# Patient Record
Sex: FEMALE | Race: WHITE | NOT HISPANIC OR LATINO
[De-identification: names, ages, dates, MRNs, and addresses within clinical notes are randomized per-mention and may not be internally consistent; named-entity substitution may affect disease eponyms.]

---

## 2019-04-09 PROBLEM — Z00.00 ENCOUNTER FOR PREVENTIVE HEALTH EXAMINATION: Status: ACTIVE | Noted: 2019-04-09

## 2019-04-12 ENCOUNTER — APPOINTMENT (OUTPATIENT)
Dept: OBGYN | Facility: CLINIC | Age: 44
End: 2019-04-12
Payer: COMMERCIAL

## 2019-04-12 ENCOUNTER — RX RENEWAL (OUTPATIENT)
Age: 44
End: 2019-04-12

## 2019-04-12 ENCOUNTER — ASOB RESULT (OUTPATIENT)
Age: 44
End: 2019-04-12

## 2019-04-12 PROCEDURE — 76811 OB US DETAILED SNGL FETUS: CPT

## 2019-04-12 PROCEDURE — 99213 OFFICE O/P EST LOW 20 MIN: CPT

## 2019-04-12 RX ORDER — PRENATAL VIT NO.126/IRON/FOLIC 28MG-0.8MG
28-0.8 TABLET ORAL DAILY
Qty: 90 | Refills: 3 | Status: ACTIVE | COMMUNITY
Start: 2019-04-12 | End: 1900-01-01

## 2019-04-12 NOTE — DISCUSSION/SUMMARY
[FreeTextEntry1] : a43 old white female came to the office for urgent exam patient hasn't had a period since November her throat she was going through the changes her sats with her PCP told her into the office pregnancy test is positive patient is 20 weeks pregnant ultrasound was done to confirm the pregnancy patient returned to a Monday for blood work to be done reassure the patient that she is okay and we'll reevaluate in 2 weeks I spent 25 minutes of the

## 2019-04-15 ENCOUNTER — APPOINTMENT (OUTPATIENT)
Dept: OBGYN | Facility: CLINIC | Age: 44
End: 2019-04-15
Payer: COMMERCIAL

## 2019-04-15 ENCOUNTER — LABORATORY RESULT (OUTPATIENT)
Age: 44
End: 2019-04-15

## 2019-04-15 PROCEDURE — 36415 COLL VENOUS BLD VENIPUNCTURE: CPT

## 2019-04-16 LAB
ABO + RH PNL BLD: NORMAL
APPEARANCE: CLEAR
BACTERIA: NEGATIVE
BASOPHILS # BLD AUTO: 0.08 K/UL
BASOPHILS NFR BLD AUTO: 0.5 %
BILIRUBIN URINE: NEGATIVE
BLD GP AB SCN SERPL QL: NORMAL
BLOOD URINE: NEGATIVE
COLOR: NORMAL
EOSINOPHIL # BLD AUTO: 0.36 K/UL
EOSINOPHIL NFR BLD AUTO: 2.3 %
GLUCOSE 1H P 50 G GLC PO SERPL-MCNC: 123 MG/DL
GLUCOSE QUALITATIVE U: NEGATIVE
HCT VFR BLD CALC: 42 %
HGB BLD-MCNC: 13.3 G/DL
HIV1+2 AB SPEC QL IA.RAPID: NONREACTIVE
HYALINE CASTS: 0 /LPF
IMM GRANULOCYTES NFR BLD AUTO: 1.1 %
KETONES URINE: ABNORMAL
LEUKOCYTE ESTERASE URINE: NEGATIVE
LYMPHOCYTES # BLD AUTO: 3.33 K/UL
LYMPHOCYTES NFR BLD AUTO: 21 %
MAN DIFF?: NORMAL
MCHC RBC-ENTMCNC: 30.7 PG
MCHC RBC-ENTMCNC: 31.7 GM/DL
MCV RBC AUTO: 97 FL
MICROSCOPIC-UA: NORMAL
MONOCYTES # BLD AUTO: 0.56 K/UL
MONOCYTES NFR BLD AUTO: 3.5 %
NEUTROPHILS # BLD AUTO: 11.33 K/UL
NEUTROPHILS NFR BLD AUTO: 71.6 %
NITRITE URINE: NEGATIVE
PH URINE: 6.5
PLATELET # BLD AUTO: 334 K/UL
PROTEIN URINE: NEGATIVE
RBC # BLD: 4.33 M/UL
RBC # FLD: 13.7 %
RED BLOOD CELLS URINE: 1 /HPF
SPECIFIC GRAVITY URINE: 1.01
SQUAMOUS EPITHELIAL CELLS: 6 /HPF
TSH SERPL-ACNC: 2.72 UIU/ML
UROBILINOGEN URINE: NORMAL
WBC # FLD AUTO: 15.83 K/UL
WHITE BLOOD CELLS URINE: 0 /HPF

## 2019-04-17 LAB
2ND TRIMESTER DATA: NORMAL
AFP PNL SERPL: NORMAL
AFP SERPL-ACNC: NORMAL
B-HCG FREE SERPL-MCNC: NORMAL
B19V IGG SER QL IA: 6.9 INDEX
B19V IGG+IGM SER-IMP: NORMAL
B19V IGG+IGM SER-IMP: POSITIVE
B19V IGM FLD-ACNC: 0.2 INDEX
B19V IGM SER-ACNC: NEGATIVE
BACTERIA UR CULT: NORMAL
CLINICAL BIOCHEMIST REVIEW: NORMAL
CMV IGG SERPL QL: <0.2 U/ML
CMV IGG SERPL-IMP: NEGATIVE
CMV IGM SERPL QL: <8 AU/ML
CMV IGM SERPL QL: NEGATIVE
HBV SURFACE AB SER QL: REACTIVE
HBV SURFACE AG SER QL: NONREACTIVE
HGB A MFR BLD: 97.1 %
HGB A2 MFR BLD: 2.9 %
HGB FRACT BLD-IMP: NORMAL
INHIBIN A SERPL-MCNC: NORMAL
NOTES NTD: NORMAL
RUBV IGG FLD-ACNC: 2 INDEX
RUBV IGG SER-IMP: POSITIVE
T GONDII AB SER-IMP: NEGATIVE
T GONDII AB SER-IMP: NEGATIVE
T GONDII IGG SER QL: <3 IU/ML
T GONDII IGM SER QL: 3.8 AU/ML
U ESTRIOL SERPL-SCNC: NORMAL

## 2019-04-18 LAB — RPR SER-TITR: NORMAL

## 2019-05-10 ENCOUNTER — NON-APPOINTMENT (OUTPATIENT)
Age: 44
End: 2019-05-10

## 2019-05-10 ENCOUNTER — APPOINTMENT (OUTPATIENT)
Dept: OBGYN | Facility: CLINIC | Age: 44
End: 2019-05-10
Payer: COMMERCIAL

## 2019-05-10 VITALS
WEIGHT: 231 LBS | DIASTOLIC BLOOD PRESSURE: 80 MMHG | SYSTOLIC BLOOD PRESSURE: 120 MMHG | BODY MASS INDEX: 34.21 KG/M2 | HEIGHT: 69 IN

## 2019-05-10 PROCEDURE — 0502F SUBSEQUENT PRENATAL CARE: CPT

## 2019-05-10 PROCEDURE — 59425 ANTEPARTUM CARE ONLY: CPT

## 2019-05-13 LAB
MEV IGG FLD QL IA: 238 AU/ML
MEV IGG+IGM SER-IMP: POSITIVE

## 2019-06-07 ENCOUNTER — APPOINTMENT (OUTPATIENT)
Dept: OBGYN | Facility: CLINIC | Age: 44
End: 2019-06-07
Payer: COMMERCIAL

## 2019-06-07 ENCOUNTER — ASOB RESULT (OUTPATIENT)
Age: 44
End: 2019-06-07

## 2019-06-07 ENCOUNTER — NON-APPOINTMENT (OUTPATIENT)
Age: 44
End: 2019-06-07

## 2019-06-07 VITALS — HEIGHT: 69 IN | SYSTOLIC BLOOD PRESSURE: 110 MMHG | DIASTOLIC BLOOD PRESSURE: 62 MMHG

## 2019-06-07 DIAGNOSIS — Z3A.33 33 WEEKS GESTATION OF PREGNANCY: ICD-10-CM

## 2019-06-07 LAB
BILIRUB UR QL STRIP: NORMAL
GLUCOSE UR-MCNC: NORMAL
HCG UR QL: 0.2 EU/DL
HGB UR QL STRIP.AUTO: NORMAL
KETONES UR-MCNC: NORMAL
LEUKOCYTE ESTERASE UR QL STRIP: NORMAL
NITRITE UR QL STRIP: NORMAL
PH UR STRIP: 6.5
PROT UR STRIP-MCNC: NORMAL
SP GR UR STRIP: 1.01

## 2019-06-07 PROCEDURE — 76816 OB US FOLLOW-UP PER FETUS: CPT

## 2019-06-07 PROCEDURE — 0502F SUBSEQUENT PRENATAL CARE: CPT

## 2019-06-21 ENCOUNTER — NON-APPOINTMENT (OUTPATIENT)
Age: 44
End: 2019-06-21

## 2019-06-21 ENCOUNTER — APPOINTMENT (OUTPATIENT)
Dept: OBGYN | Facility: CLINIC | Age: 44
End: 2019-06-21
Payer: COMMERCIAL

## 2019-06-21 VITALS
SYSTOLIC BLOOD PRESSURE: 110 MMHG | DIASTOLIC BLOOD PRESSURE: 70 MMHG | BODY MASS INDEX: 35.25 KG/M2 | HEIGHT: 69 IN | WEIGHT: 238 LBS

## 2019-06-21 PROCEDURE — 0502F SUBSEQUENT PRENATAL CARE: CPT | Mod: 25

## 2019-06-21 PROCEDURE — 36415 COLL VENOUS BLD VENIPUNCTURE: CPT

## 2019-06-21 PROCEDURE — 81003 URINALYSIS AUTO W/O SCOPE: CPT | Mod: QW

## 2019-06-22 LAB
APPEARANCE: CLEAR
BACTERIA: NEGATIVE
BASOPHILS # BLD AUTO: 0.05 K/UL
BASOPHILS NFR BLD AUTO: 0.4 %
BILIRUB UR QL STRIP: NORMAL
BILIRUBIN URINE: NEGATIVE
BLOOD URINE: NEGATIVE
COLOR: YELLOW
EOSINOPHIL # BLD AUTO: 0.11 K/UL
EOSINOPHIL NFR BLD AUTO: 0.8 %
GLUCOSE BS SERPL-MCNC: 81 MG/DL
GLUCOSE QUALITATIVE U: NEGATIVE
GLUCOSE UR-MCNC: NORMAL
HBV SURFACE AG SER QL: NONREACTIVE
HCG UR QL: 0.2 EU/DL
HCT VFR BLD CALC: 40.1 %
HGB BLD-MCNC: 12.7 G/DL
HGB UR QL STRIP.AUTO: NORMAL
HIV1+2 AB SPEC QL IA.RAPID: NONREACTIVE
HYALINE CASTS: 0 /LPF
IMM GRANULOCYTES NFR BLD AUTO: 1 %
KETONES UR-MCNC: 15
KETONES URINE: ABNORMAL
LEUKOCYTE ESTERASE UR QL STRIP: NORMAL
LEUKOCYTE ESTERASE URINE: NEGATIVE
LYMPHOCYTES # BLD AUTO: 2.48 K/UL
LYMPHOCYTES NFR BLD AUTO: 18.9 %
MAN DIFF?: NORMAL
MCHC RBC-ENTMCNC: 31.4 PG
MCHC RBC-ENTMCNC: 31.7 GM/DL
MCV RBC AUTO: 99 FL
MICROSCOPIC-UA: NORMAL
MONOCYTES # BLD AUTO: 0.67 K/UL
MONOCYTES NFR BLD AUTO: 5.1 %
NEUTROPHILS # BLD AUTO: 9.69 K/UL
NEUTROPHILS NFR BLD AUTO: 73.8 %
NITRITE UR QL STRIP: NORMAL
NITRITE URINE: NEGATIVE
PH UR STRIP: 7
PH URINE: 7
PLATELET # BLD AUTO: 257 K/UL
PROT UR STRIP-MCNC: NORMAL
PROTEIN URINE: NEGATIVE
RBC # BLD: 4.05 M/UL
RBC # FLD: 13.9 %
RED BLOOD CELLS URINE: 1 /HPF
SP GR UR STRIP: 1.01
SPECIFIC GRAVITY URINE: 1.01
SQUAMOUS EPITHELIAL CELLS: 2 /HPF
UROBILINOGEN URINE: NORMAL
WBC # FLD AUTO: 13.13 K/UL
WHITE BLOOD CELLS URINE: 0 /HPF

## 2019-06-25 LAB
ABO + RH PNL BLD: NORMAL
BLD GP AB SCN SERPL QL: NORMAL
ESTIMATED AVERAGE GLUCOSE: 117 MG/DL
HBA1C MFR BLD HPLC: 5.7 %
HBV SURFACE AB SER QL: REACTIVE
RPR SER-TITR: NORMAL

## 2019-07-12 ENCOUNTER — NON-APPOINTMENT (OUTPATIENT)
Age: 44
End: 2019-07-12

## 2019-07-12 ENCOUNTER — APPOINTMENT (OUTPATIENT)
Dept: OBGYN | Facility: CLINIC | Age: 44
End: 2019-07-12
Payer: COMMERCIAL

## 2019-07-12 VITALS
DIASTOLIC BLOOD PRESSURE: 74 MMHG | WEIGHT: 240 LBS | BODY MASS INDEX: 35.55 KG/M2 | SYSTOLIC BLOOD PRESSURE: 108 MMHG | HEIGHT: 69 IN

## 2019-07-12 LAB
BILIRUB UR QL STRIP: NORMAL
CLARITY UR: CLEAR
COLLECTION METHOD: NORMAL
GLUCOSE UR-MCNC: NORMAL
HCG UR QL: 0.2 EU/DL
HGB UR QL STRIP.AUTO: NORMAL
KETONES UR-MCNC: NORMAL
LEUKOCYTE ESTERASE UR QL STRIP: NORMAL
NITRITE UR QL STRIP: NORMAL
PH UR STRIP: 7
PROT UR STRIP-MCNC: NORMAL
SP GR UR STRIP: 1.01

## 2019-07-12 PROCEDURE — 0502F SUBSEQUENT PRENATAL CARE: CPT

## 2019-07-12 PROCEDURE — 90715 TDAP VACCINE 7 YRS/> IM: CPT

## 2019-07-12 PROCEDURE — 96372 THER/PROPH/DIAG INJ SC/IM: CPT

## 2019-07-12 PROCEDURE — 90471 IMMUNIZATION ADMIN: CPT

## 2019-08-01 ENCOUNTER — TRANSCRIPTION ENCOUNTER (OUTPATIENT)
Age: 44
End: 2019-08-01

## 2019-08-01 ENCOUNTER — INPATIENT (INPATIENT)
Facility: HOSPITAL | Age: 44
LOS: 0 days | Discharge: ANOTHER HOSPITAL | End: 2019-08-02
Payer: COMMERCIAL

## 2019-08-01 PROCEDURE — 99285 EMERGENCY DEPT VISIT HI MDM: CPT

## 2019-08-02 ENCOUNTER — APPOINTMENT (OUTPATIENT)
Dept: OBGYN | Facility: CLINIC | Age: 44
End: 2019-08-02

## 2019-08-02 ENCOUNTER — INPATIENT (INPATIENT)
Facility: HOSPITAL | Age: 44
LOS: 2 days | Discharge: HOME | End: 2019-08-05
Attending: OBSTETRICS & GYNECOLOGY | Admitting: OBSTETRICS & GYNECOLOGY
Payer: COMMERCIAL

## 2019-08-02 ENCOUNTER — RESULT REVIEW (OUTPATIENT)
Age: 44
End: 2019-08-02

## 2019-08-02 VITALS
RESPIRATION RATE: 18 BRPM | TEMPERATURE: 100 F | HEIGHT: 69 IN | DIASTOLIC BLOOD PRESSURE: 68 MMHG | HEART RATE: 107 BPM | WEIGHT: 240.08 LBS | SYSTOLIC BLOOD PRESSURE: 133 MMHG

## 2019-08-02 DIAGNOSIS — O47.1 FALSE LABOR AT OR AFTER 37 COMPLETED WEEKS OF GESTATION: ICD-10-CM

## 2019-08-02 LAB
BASOPHILS # BLD AUTO: 0.03 K/UL — SIGNIFICANT CHANGE UP (ref 0–0.2)
BASOPHILS NFR BLD AUTO: 0.2 % — SIGNIFICANT CHANGE UP (ref 0–2)
BLD GP AB SCN SERPL QL: SIGNIFICANT CHANGE UP
EOSINOPHIL # BLD AUTO: 0.02 K/UL — SIGNIFICANT CHANGE UP (ref 0–0.5)
EOSINOPHIL NFR BLD AUTO: 0.1 % — SIGNIFICANT CHANGE UP (ref 0–6)
HCT VFR BLD CALC: 36.8 % — SIGNIFICANT CHANGE UP (ref 34.5–45)
HGB BLD-MCNC: 12.3 G/DL — SIGNIFICANT CHANGE UP (ref 11.5–15.5)
HIV 1 & 2 AB SERPL IA.RAPID: SIGNIFICANT CHANGE UP
IMM GRANULOCYTES NFR BLD AUTO: 0.5 % — SIGNIFICANT CHANGE UP (ref 0–1.5)
LYMPHOCYTES # BLD AUTO: 1.16 K/UL — SIGNIFICANT CHANGE UP (ref 1–3.3)
LYMPHOCYTES # BLD AUTO: 7.3 % — LOW (ref 13–44)
MCHC RBC-ENTMCNC: 31.5 PG — SIGNIFICANT CHANGE UP (ref 27–34)
MCHC RBC-ENTMCNC: 33.4 GM/DL — SIGNIFICANT CHANGE UP (ref 32–36)
MCV RBC AUTO: 94.4 FL — SIGNIFICANT CHANGE UP (ref 80–100)
MONOCYTES # BLD AUTO: 0.23 K/UL — SIGNIFICANT CHANGE UP (ref 0–0.9)
MONOCYTES NFR BLD AUTO: 1.4 % — LOW (ref 2–14)
NEUTROPHILS # BLD AUTO: 14.45 K/UL — HIGH (ref 1.8–7.4)
NEUTROPHILS NFR BLD AUTO: 90.5 % — HIGH (ref 43–77)
PLATELET # BLD AUTO: 221 K/UL — SIGNIFICANT CHANGE UP (ref 150–400)
RBC # BLD: 3.9 M/UL — SIGNIFICANT CHANGE UP (ref 3.8–5.2)
RBC # FLD: 13.3 % — SIGNIFICANT CHANGE UP (ref 10.3–14.5)
WBC # BLD: 15.97 K/UL — HIGH (ref 3.8–10.5)
WBC # FLD AUTO: 15.97 K/UL — HIGH (ref 3.8–10.5)

## 2019-08-02 PROCEDURE — 88302 TISSUE EXAM BY PATHOLOGIST: CPT | Mod: 26

## 2019-08-02 PROCEDURE — 58611 LIGATE OVIDUCT(S) ADD-ON: CPT

## 2019-08-02 PROCEDURE — 59514 CESAREAN DELIVERY ONLY: CPT

## 2019-08-02 RX ORDER — AZITHROMYCIN 500 MG/1
500 TABLET, FILM COATED ORAL ONCE
Refills: 0 | Status: COMPLETED | OUTPATIENT
Start: 2019-08-02 | End: 2019-08-02

## 2019-08-02 RX ORDER — SODIUM CHLORIDE 9 MG/ML
1000 INJECTION, SOLUTION INTRAVENOUS
Refills: 0 | Status: DISCONTINUED | OUTPATIENT
Start: 2019-08-02 | End: 2019-08-05

## 2019-08-02 RX ORDER — NALOXONE HYDROCHLORIDE 4 MG/.1ML
0.1 SPRAY NASAL
Refills: 0 | Status: DISCONTINUED | OUTPATIENT
Start: 2019-08-02 | End: 2019-08-05

## 2019-08-02 RX ORDER — KETOROLAC TROMETHAMINE 30 MG/ML
30 SYRINGE (ML) INJECTION EVERY 6 HOURS
Refills: 0 | Status: DISCONTINUED | OUTPATIENT
Start: 2019-08-02 | End: 2019-08-03

## 2019-08-02 RX ORDER — FAMOTIDINE 10 MG/ML
20 INJECTION INTRAVENOUS ONCE
Refills: 0 | Status: COMPLETED | OUTPATIENT
Start: 2019-08-02 | End: 2019-08-02

## 2019-08-02 RX ORDER — CITRIC ACID/SODIUM CITRATE 300-500 MG
30 SOLUTION, ORAL ORAL ONCE
Refills: 0 | Status: COMPLETED | OUTPATIENT
Start: 2019-08-02 | End: 2019-08-02

## 2019-08-02 RX ORDER — ONDANSETRON 8 MG/1
4 TABLET, FILM COATED ORAL EVERY 6 HOURS
Refills: 0 | Status: DISCONTINUED | OUTPATIENT
Start: 2019-08-02 | End: 2019-08-05

## 2019-08-02 RX ORDER — IBUPROFEN 200 MG
600 TABLET ORAL EVERY 6 HOURS
Refills: 0 | Status: COMPLETED | OUTPATIENT
Start: 2019-08-03 | End: 2020-07-01

## 2019-08-02 RX ORDER — SODIUM CHLORIDE 9 MG/ML
1000 INJECTION, SOLUTION INTRAVENOUS ONCE
Refills: 0 | Status: COMPLETED | OUTPATIENT
Start: 2019-08-02 | End: 2019-08-02

## 2019-08-02 RX ORDER — LANOLIN
1 OINTMENT (GRAM) TOPICAL EVERY 6 HOURS
Refills: 0 | Status: DISCONTINUED | OUTPATIENT
Start: 2019-08-02 | End: 2019-08-05

## 2019-08-02 RX ORDER — MAGNESIUM HYDROXIDE 400 MG/1
30 TABLET, CHEWABLE ORAL
Refills: 0 | Status: DISCONTINUED | OUTPATIENT
Start: 2019-08-02 | End: 2019-08-05

## 2019-08-02 RX ORDER — DIPHENHYDRAMINE HCL 50 MG
25 CAPSULE ORAL EVERY 6 HOURS
Refills: 0 | Status: DISCONTINUED | OUTPATIENT
Start: 2019-08-02 | End: 2019-08-05

## 2019-08-02 RX ORDER — DIPHENHYDRAMINE HCL 50 MG
25 CAPSULE ORAL EVERY 4 HOURS
Refills: 0 | Status: DISCONTINUED | OUTPATIENT
Start: 2019-08-03 | End: 2019-08-05

## 2019-08-02 RX ORDER — SIMETHICONE 80 MG/1
80 TABLET, CHEWABLE ORAL EVERY 4 HOURS
Refills: 0 | Status: DISCONTINUED | OUTPATIENT
Start: 2019-08-02 | End: 2019-08-05

## 2019-08-02 RX ORDER — GLYCERIN ADULT
1 SUPPOSITORY, RECTAL RECTAL AT BEDTIME
Refills: 0 | Status: DISCONTINUED | OUTPATIENT
Start: 2019-08-02 | End: 2019-08-05

## 2019-08-02 RX ORDER — CEFAZOLIN SODIUM 1 G
2000 VIAL (EA) INJECTION ONCE
Refills: 0 | Status: COMPLETED | OUTPATIENT
Start: 2019-08-02 | End: 2019-08-02

## 2019-08-02 RX ORDER — OXYCODONE HYDROCHLORIDE 5 MG/1
5 TABLET ORAL ONCE
Refills: 0 | Status: DISCONTINUED | OUTPATIENT
Start: 2019-08-03 | End: 2019-08-05

## 2019-08-02 RX ORDER — NALBUPHINE HYDROCHLORIDE 10 MG/ML
5 INJECTION, SOLUTION INTRAMUSCULAR; INTRAVENOUS; SUBCUTANEOUS ONCE
Refills: 0 | Status: DISCONTINUED | OUTPATIENT
Start: 2019-08-03 | End: 2019-08-05

## 2019-08-02 RX ORDER — METOCLOPRAMIDE HCL 10 MG
10 TABLET ORAL ONCE
Refills: 0 | Status: DISCONTINUED | OUTPATIENT
Start: 2019-08-02 | End: 2019-08-02

## 2019-08-02 RX ORDER — ENOXAPARIN SODIUM 100 MG/ML
40 INJECTION SUBCUTANEOUS EVERY 24 HOURS
Refills: 0 | Status: DISCONTINUED | OUTPATIENT
Start: 2019-08-02 | End: 2019-08-05

## 2019-08-02 RX ORDER — DOCUSATE SODIUM 100 MG
100 CAPSULE ORAL
Refills: 0 | Status: DISCONTINUED | OUTPATIENT
Start: 2019-08-02 | End: 2019-08-05

## 2019-08-02 RX ORDER — OXYTOCIN 10 UNIT/ML
333.33 VIAL (ML) INJECTION
Qty: 20 | Refills: 0 | Status: DISCONTINUED | OUTPATIENT
Start: 2019-08-02 | End: 2019-08-05

## 2019-08-02 RX ORDER — TETANUS TOXOID, REDUCED DIPHTHERIA TOXOID AND ACELLULAR PERTUSSIS VACCINE, ADSORBED 5; 2.5; 8; 8; 2.5 [IU]/.5ML; [IU]/.5ML; UG/.5ML; UG/.5ML; UG/.5ML
0.5 SUSPENSION INTRAMUSCULAR ONCE
Refills: 0 | Status: DISCONTINUED | OUTPATIENT
Start: 2019-08-03 | End: 2019-08-05

## 2019-08-02 RX ORDER — SODIUM CHLORIDE 9 MG/ML
1000 INJECTION, SOLUTION INTRAVENOUS
Refills: 0 | Status: DISCONTINUED | OUTPATIENT
Start: 2019-08-02 | End: 2019-08-02

## 2019-08-02 RX ORDER — DEXAMETHASONE 0.5 MG/5ML
4 ELIXIR ORAL EVERY 6 HOURS
Refills: 0 | Status: DISCONTINUED | OUTPATIENT
Start: 2019-08-02 | End: 2019-08-05

## 2019-08-02 RX ORDER — OXYCODONE HYDROCHLORIDE 5 MG/1
5 TABLET ORAL
Refills: 0 | Status: DISCONTINUED | OUTPATIENT
Start: 2019-08-03 | End: 2019-08-05

## 2019-08-02 RX ORDER — ACETAMINOPHEN 500 MG
975 TABLET ORAL
Refills: 0 | Status: DISCONTINUED | OUTPATIENT
Start: 2019-08-02 | End: 2019-08-05

## 2019-08-02 RX ADMIN — Medication 100 MILLIGRAM(S): at 03:50

## 2019-08-02 RX ADMIN — Medication 1000 MILLIUNIT(S)/MIN: at 04:26

## 2019-08-02 RX ADMIN — Medication 30 MILLIGRAM(S): at 08:12

## 2019-08-02 RX ADMIN — AZITHROMYCIN 255 MILLIGRAM(S): 500 TABLET, FILM COATED ORAL at 03:51

## 2019-08-02 RX ADMIN — Medication 975 MILLIGRAM(S): at 23:15

## 2019-08-02 RX ADMIN — ONDANSETRON 4 MILLIGRAM(S): 8 TABLET, FILM COATED ORAL at 10:42

## 2019-08-02 RX ADMIN — ENOXAPARIN SODIUM 40 MILLIGRAM(S): 100 INJECTION SUBCUTANEOUS at 17:34

## 2019-08-02 RX ADMIN — Medication 30 MILLIGRAM(S): at 17:49

## 2019-08-02 RX ADMIN — Medication 30 MILLILITER(S): at 03:26

## 2019-08-02 RX ADMIN — Medication 975 MILLIGRAM(S): at 22:26

## 2019-08-02 RX ADMIN — SODIUM CHLORIDE 2000 MILLILITER(S): 9 INJECTION, SOLUTION INTRAVENOUS at 03:00

## 2019-08-02 RX ADMIN — FAMOTIDINE 20 MILLIGRAM(S): 10 INJECTION INTRAVENOUS at 03:26

## 2019-08-02 RX ADMIN — Medication 30 MILLIGRAM(S): at 17:34

## 2019-08-02 NOTE — OB PROVIDER H&P - HISTORY OF PRESENT ILLNESS
40 yo  here at 36 wks ( by LMP) presented for PPROM since this afternoon and contractions. She has a history of two prior  sections.    PMH: denies  PSH: cs x2    obhx: CS 1- , emergent   CS 2-  - elective repeat 42 yo  here at 36 wks ( by LMP) presented for PPROM since this afternoon and contractions. She has a history of two prior  sections. This pregnancy was complicated by being late to care (presenting at 5 months GA per pt). Pt desires tubal ligation.     PMH: denies  PSH: cs x2    obhx: CS 1- , emergent   CS 2-  - elective repeat

## 2019-08-02 NOTE — OB NEONATOLOGY/PEDIATRICIAN DELIVERY SUMMARY - NSPEDSNEONOTESA_OBGYN_ALL_OB_FT
Requested by Dr Aceves to attend a RC/S in labor of a 43 y/o  mom at 36 weeks GA, transfer from [facility]..  She had + PNC, is A pos, HIV neg, HBsAg neg, RPR <1:1, Rubella Immune, GBS Unk.  L&D: SROM aprox 10 hrs PTD.  She received Ampicillin, Beta #1, and terbutaline prior to transfer at [facility]. Baby born vertex with good cry, transferred to warmer, orally suctioned, dried, and examined.  Baby showed to father and then transferred to mom for STS.examined. Requested by Dr Aceves to attend a RC/S in labor of a 43 y/o  mom at 36 weeks GA, transfer from [facility]..  She had + PNC, (late to care since she thought that she was in early menopause), is A pos, HIV neg, HBsAg neg, RPR <1:1, Rubella Immune, GBS Unk.  L&D: SROM aprox 10 hrs PTD.  She received Ampicillin, Beta #1, and terbutaline prior to transfer at [facility]. Baby born vertex with good cry, transferred to warmer, orally suctioned, dried, and examined.  Baby showed to father and then transferred to mom for STS.  Will transition to Regular Nursery under PMD care.  Monitor glucose as per protocol.  BW: 3800g.

## 2019-08-02 NOTE — OB PROVIDER H&P - ASSESSMENT
40 yo  here at 36 wks for PPROM and  contractions with history of two prior  sections.   - admit  - consent  - continuous monitoring  - 2 g ancef

## 2019-08-02 NOTE — BRIEF OPERATIVE NOTE - NSICDXBRIEFPREOP_GEN_ALL_CORE_FT
PRE-OP DIAGNOSIS:   premature rupture of membranes with onset of labor within 24 hours of rupture 02-Aug-2019 05:01:59  Keshav Aceves  Previous  delivery affecting pregnancy 02-Aug-2019 05:01:01  Keshav Aceves

## 2019-08-03 LAB
BASOPHILS # BLD AUTO: 0.03 K/UL — SIGNIFICANT CHANGE UP (ref 0–0.2)
BASOPHILS NFR BLD AUTO: 0.2 % — SIGNIFICANT CHANGE UP (ref 0–2)
EOSINOPHIL # BLD AUTO: 0 K/UL — SIGNIFICANT CHANGE UP (ref 0–0.5)
EOSINOPHIL NFR BLD AUTO: 0 % — SIGNIFICANT CHANGE UP (ref 0–6)
HCT VFR BLD CALC: 35.5 % — SIGNIFICANT CHANGE UP (ref 34.5–45)
HGB BLD-MCNC: 11.1 G/DL — LOW (ref 11.5–15.5)
HIV 1+2 AB+HIV1 P24 AG SERPL QL IA: SIGNIFICANT CHANGE UP
IMM GRANULOCYTES NFR BLD AUTO: 1 % — SIGNIFICANT CHANGE UP (ref 0–1.5)
LYMPHOCYTES # BLD AUTO: 12.7 % — LOW (ref 13–44)
LYMPHOCYTES # BLD AUTO: 2.5 K/UL — SIGNIFICANT CHANGE UP (ref 1–3.3)
MCHC RBC-ENTMCNC: 31.3 GM/DL — LOW (ref 32–36)
MCHC RBC-ENTMCNC: 31.4 PG — SIGNIFICANT CHANGE UP (ref 27–34)
MCV RBC AUTO: 100.3 FL — HIGH (ref 80–100)
MEV IGG SER-ACNC: 225 AU/ML — SIGNIFICANT CHANGE UP
MEV IGG+IGM SER-IMP: POSITIVE — SIGNIFICANT CHANGE UP
MONOCYTES # BLD AUTO: 1.02 K/UL — HIGH (ref 0–0.9)
MONOCYTES NFR BLD AUTO: 5.2 % — SIGNIFICANT CHANGE UP (ref 2–14)
NEUTROPHILS # BLD AUTO: 16.01 K/UL — HIGH (ref 1.8–7.4)
NEUTROPHILS NFR BLD AUTO: 80.9 % — HIGH (ref 43–77)
PLATELET # BLD AUTO: 246 K/UL — SIGNIFICANT CHANGE UP (ref 150–400)
RBC # BLD: 3.54 M/UL — LOW (ref 3.8–5.2)
RBC # FLD: 13.7 % — SIGNIFICANT CHANGE UP (ref 10.3–14.5)
T PALLIDUM AB TITR SER: NEGATIVE — SIGNIFICANT CHANGE UP
WBC # BLD: 19.76 K/UL — HIGH (ref 3.8–10.5)
WBC # FLD AUTO: 19.76 K/UL — HIGH (ref 3.8–10.5)

## 2019-08-03 RX ORDER — IBUPROFEN 200 MG
600 TABLET ORAL EVERY 6 HOURS
Refills: 0 | Status: DISCONTINUED | OUTPATIENT
Start: 2019-08-03 | End: 2019-08-05

## 2019-08-03 RX ADMIN — Medication 30 MILLIGRAM(S): at 04:41

## 2019-08-03 RX ADMIN — Medication 975 MILLIGRAM(S): at 18:21

## 2019-08-03 RX ADMIN — SIMETHICONE 80 MILLIGRAM(S): 80 TABLET, CHEWABLE ORAL at 12:03

## 2019-08-03 RX ADMIN — Medication 600 MILLIGRAM(S): at 12:02

## 2019-08-03 RX ADMIN — Medication 30 MILLIGRAM(S): at 04:27

## 2019-08-03 RX ADMIN — Medication 975 MILLIGRAM(S): at 12:02

## 2019-08-03 RX ADMIN — ENOXAPARIN SODIUM 40 MILLIGRAM(S): 100 INJECTION SUBCUTANEOUS at 21:43

## 2019-08-03 RX ADMIN — Medication 600 MILLIGRAM(S): at 18:20

## 2019-08-03 RX ADMIN — Medication 100 MILLIGRAM(S): at 12:03

## 2019-08-03 NOTE — PROGRESS NOTE ADULT - ASSESSMENT
Section Day POD 1  44y yo   s/p   She is doing well.     Continue the current pain medication.   f/u H&H  Plan for circumcision tomorrow  Encourage ambulation and regular diet.  DVT ppx: ambulation & lovenox  She is stable, tolerates a diet and has normal bowel function.  She will be discharged on Day 2,3 or 4 according to the normal criteria.

## 2019-08-03 NOTE — PROGRESS NOTE ADULT - SUBJECTIVE AND OBJECTIVE BOX
She is a  44y  who is now post-operative day 1 from repeat  section  and bilateral salpingectomy        Subjective:  The patient feels well with no acute issues overnight.   She is ambulating and tolerating a diet. She is having  small amounts of gas. + flatus - bowel movements.  She reports no breathing problems, headache or visual changes. She reports normal postpartum bleeding. Pain controlled with oral medications  She is breast feeding.    Vital Signs Last 24 Hrs  T(C): 36.9 (03 Aug 2019 00:05), Max: 36.9 (03 Aug 2019 00:05)  T(F): 98.4 (03 Aug 2019 00:05), Max: 98.4 (03 Aug 2019 00:05)  HR: 79 (03 Aug 2019 00:05) (79 - 90)  BP: 102/60 (03 Aug 2019 00:05) (102/60 - 121/74)    RR: 18 (03 Aug 2019 00:05) (18 - 19)  SpO2: 97% (03 Aug 2019 00:05) (97% - 98%)    Physical exam:  Gen: well appearing NAD  Cardio: RRR  Pulm: CTABL, no increased work of breathing   Breast: non-engorged   Abdomen: Soft, nontender, no distension , firm uterine fundus, the incision is clean dry and intact with steri's in place.  Pelvic: Normal lochia noted.  Ext: No DVT signs, warm extremities.    LABS:   pending AM labs                 Allergies    No Known Allergies    Intolerances

## 2019-08-04 ENCOUNTER — TRANSCRIPTION ENCOUNTER (OUTPATIENT)
Age: 44
End: 2019-08-04

## 2019-08-04 PROCEDURE — 93970 EXTREMITY STUDY: CPT | Mod: 26

## 2019-08-04 RX ORDER — IBUPROFEN 200 MG
1 TABLET ORAL
Qty: 28 | Refills: 0
Start: 2019-08-04 | End: 2019-08-10

## 2019-08-04 RX ORDER — DOCUSATE SODIUM 100 MG
1 CAPSULE ORAL
Qty: 6 | Refills: 0
Start: 2019-08-04 | End: 2019-08-06

## 2019-08-04 RX ADMIN — Medication 600 MILLIGRAM(S): at 22:30

## 2019-08-04 RX ADMIN — Medication 975 MILLIGRAM(S): at 21:30

## 2019-08-04 RX ADMIN — Medication 975 MILLIGRAM(S): at 00:47

## 2019-08-04 RX ADMIN — Medication 600 MILLIGRAM(S): at 05:31

## 2019-08-04 RX ADMIN — Medication 975 MILLIGRAM(S): at 06:30

## 2019-08-04 RX ADMIN — Medication 975 MILLIGRAM(S): at 05:31

## 2019-08-04 RX ADMIN — Medication 600 MILLIGRAM(S): at 06:30

## 2019-08-04 RX ADMIN — Medication 975 MILLIGRAM(S): at 01:45

## 2019-08-04 RX ADMIN — Medication 975 MILLIGRAM(S): at 15:32

## 2019-08-04 RX ADMIN — ENOXAPARIN SODIUM 40 MILLIGRAM(S): 100 INJECTION SUBCUTANEOUS at 21:29

## 2019-08-04 RX ADMIN — Medication 600 MILLIGRAM(S): at 21:40

## 2019-08-04 RX ADMIN — Medication 600 MILLIGRAM(S): at 01:45

## 2019-08-04 RX ADMIN — Medication 600 MILLIGRAM(S): at 00:47

## 2019-08-04 RX ADMIN — Medication 975 MILLIGRAM(S): at 22:30

## 2019-08-04 RX ADMIN — Medication 975 MILLIGRAM(S): at 16:10

## 2019-08-04 NOTE — DISCHARGE NOTE OB - DISCHARGE DISPOSITION
My signature below certifies that the above stated patient is homebound and upon completion of the Face-To-Face encounter, has the need for intermittent skilled nursing, physical therapy and/or speech or occupational therapy services in their home for their current diagnosis as outlined in their initial plan of care. These services will continue to be monitored by myself or another physician.
Home/with Baby

## 2019-08-04 NOTE — DISCHARGE NOTE OB - CARE PROVIDER_API CALL
Keshav Aceves)  Obstetrics and Gynecology  [address], [address]  [place] [address]  Phone: [phone]  Fax: [phone]  Follow Up Time:

## 2019-08-04 NOTE — DISCHARGE NOTE OB - MEDICATION SUMMARY - MEDICATIONS TO TAKE
I will START or STAY ON the medications listed below when I get home from the hospital:    ibuprofen 600 mg oral tablet  -- 1 tab(s) by mouth every 6 hours, As Needed -for mild pain - for moderate pain   -- Indication: For mild to moderate pain    oxycodone-acetaminophen 5 mg-325 mg oral tablet  -- 1 tab(s) by mouth every 6 hours, As Needed -for severe pain MDD:4   -- Caution federal law prohibits the transfer of this drug to any person other  than the person for whom it was prescribed.  May cause drowsiness.  Alcohol may intensify this effect.  Use care when operating dangerous machinery.  This prescription cannot be refilled.  This product contains acetaminophen.  Do not use  with any other product containing acetaminophen to prevent possible liver damage.  Using more of this medication than prescribed may cause serious breathing problems.    -- Indication: For Severe pain    docusate sodium 100 mg oral capsule  -- 1 cap(s) by mouth 2 times a day, As needed, Stool softening  -- Indication: For While taking narcotics I will START or STAY ON the medications listed below when I get home from the hospital:    ibuprofen 600 mg oral tablet  -- 1 tab(s) by mouth every 6 hours, As Needed -for mild pain - for moderate pain   -- Indication: For pain    oxycodone-acetaminophen 5 mg-325 mg oral tablet  -- 1 tab(s) by mouth every 6 hours, As Needed -for severe pain MDD:4   -- Caution federal law prohibits the transfer of this drug to any person other  than the person for whom it was prescribed.  May cause drowsiness.  Alcohol may intensify this effect.  Use care when operating dangerous machinery.  This prescription cannot be refilled.  This product contains acetaminophen.  Do not use  with any other product containing acetaminophen to prevent possible liver damage.  Using more of this medication than prescribed may cause serious breathing problems.    -- Indication: For pain    docusate sodium 100 mg oral capsule  -- 1 cap(s) by mouth 2 times a day, As needed, Stool softening  -- Indication: For Constipation

## 2019-08-04 NOTE — DISCHARGE NOTE OB - HOSPITAL COURSE
Pt is 45yo  s/p repeat  section and bilateral salpingectomy. She was transferred to postpartum unit without complications during her stay. Upon discharge she is voiding, tolerating PO, ambulating, and pain is well controlled.

## 2019-08-04 NOTE — PROGRESS NOTE ADULT - SUBJECTIVE AND OBJECTIVE BOX
Patient is a 45yo  now POD#2 s/p repeat  section and BTL 2/2 to presenting with  labor in setting of 2 prior c-sections    This pregnancy has been complicated by:  1. late to care (first appointment at roughly 5 months)    S:    The patient complains of mild right lower extremity that is much improved from yesterday and now is confined to the ankle. Pain controlled with medication   She is ambulating without difficulty and tolerating PO.   + flatus/-BM/+ voiding     O:    T(C): 36.8 (19 @ 20:42), Max: 36.8 (19 @ 20:42)  HR: 88 (19 @ 20:42) (74 - 88)  BP: 145/82 (19 @ 20:42) (122/79 - 145/82)  RR: 20 (19 @ 20:42) (18 - 20)  SpO2: 98% (19 @ 20:42) (98% - 98%)    Breast: nontender, not engorged   Abdomen:  soft, non-tender, non-distended, +bowel sounds.  Uterus:  Fundus firm below umbilicus  Incision:  Clean/dry/intact, steri-strips in place  VE:  +lochia  Ext:  Non-tender, no edema                           11.1   19.76 )-----------( 246      ( 03 Aug 2019 08:48 )             35.5

## 2019-08-04 NOTE — DISCHARGE NOTE OB - PATIENT PORTAL LINK FT
You can access the [facility], offered by [facility], by registering with the following website: [email or web address]

## 2019-08-04 NOTE — PROGRESS NOTE ADULT - ASSESSMENT
A/P:  Patient is a 45yo  now POD#2 s/p repeat  section / to  labor with 2 prior c-sections    -Stable  -LE dopplers negative for DVT last night, RLE edema improved  -Voiding, tolerating PO, bowel function nml   -Advance care as tolerated   -Continue routine postpartum/postoperative care and education.  -Healthy male infant desires circumcision.

## 2019-08-05 VITALS
DIASTOLIC BLOOD PRESSURE: 77 MMHG | HEART RATE: 77 BPM | RESPIRATION RATE: 20 BRPM | TEMPERATURE: 98 F | OXYGEN SATURATION: 99 % | SYSTOLIC BLOOD PRESSURE: 130 MMHG

## 2019-08-05 PROCEDURE — 87389 HIV-1 AG W/HIV-1&-2 AB AG IA: CPT

## 2019-08-05 PROCEDURE — 85027 COMPLETE CBC AUTOMATED: CPT

## 2019-08-05 PROCEDURE — 86901 BLOOD TYPING SEROLOGIC RH(D): CPT

## 2019-08-05 PROCEDURE — 88302 TISSUE EXAM BY PATHOLOGIST: CPT

## 2019-08-05 PROCEDURE — 59025 FETAL NON-STRESS TEST: CPT

## 2019-08-05 PROCEDURE — 36415 COLL VENOUS BLD VENIPUNCTURE: CPT

## 2019-08-05 PROCEDURE — 86765 RUBEOLA ANTIBODY: CPT

## 2019-08-05 PROCEDURE — 86780 TREPONEMA PALLIDUM: CPT

## 2019-08-05 PROCEDURE — 59050 FETAL MONITOR W/REPORT: CPT

## 2019-08-05 PROCEDURE — 86850 RBC ANTIBODY SCREEN: CPT

## 2019-08-05 PROCEDURE — G0463: CPT

## 2019-08-05 PROCEDURE — 93970 EXTREMITY STUDY: CPT

## 2019-08-05 PROCEDURE — 86703 HIV-1/HIV-2 1 RESULT ANTBDY: CPT

## 2019-08-05 PROCEDURE — 99238 HOSP IP/OBS DSCHRG MGMT 30/<: CPT | Mod: GC

## 2019-08-05 PROCEDURE — 86900 BLOOD TYPING SEROLOGIC ABO: CPT

## 2019-08-05 RX ORDER — CALCIUM CARBONATE 500(1250)
2 TABLET ORAL EVERY 6 HOURS
Refills: 0 | Status: DISCONTINUED | OUTPATIENT
Start: 2019-08-05 | End: 2019-08-05

## 2019-08-05 RX ADMIN — Medication 975 MILLIGRAM(S): at 09:06

## 2019-08-05 RX ADMIN — Medication 975 MILLIGRAM(S): at 02:16

## 2019-08-05 RX ADMIN — Medication 600 MILLIGRAM(S): at 10:00

## 2019-08-05 RX ADMIN — Medication 600 MILLIGRAM(S): at 03:15

## 2019-08-05 RX ADMIN — Medication 2 TABLET(S): at 02:15

## 2019-08-05 RX ADMIN — Medication 600 MILLIGRAM(S): at 02:16

## 2019-08-05 RX ADMIN — Medication 975 MILLIGRAM(S): at 03:15

## 2019-08-05 RX ADMIN — Medication 600 MILLIGRAM(S): at 09:07

## 2019-08-05 RX ADMIN — Medication 975 MILLIGRAM(S): at 10:00

## 2019-08-05 NOTE — PROGRESS NOTE ADULT - ATTENDING COMMENTS
Patient seen and examined by me. Pain controlled with PO medications - ambulating without difficulty, bowel function returned  Vitals and labs reviewed  Abd - soft, no guarding/rebound, fundus firm - incision c/d/i with steris  Lower extremity - bilateral edema, no calf tenderness bilaterally (lower ext dopplers negative)    Stable for discharge home, wound care and postop follow-up reviewed with patient  Sabine Koroma MD

## 2019-08-05 NOTE — PROGRESS NOTE ADULT - SUBJECTIVE AND OBJECTIVE BOX
Delivery Progress Note    AMRIT Golden is a 44y year old  who is post-op day #3 who delivered a male infant at 36w for PPROM and labor by repeat  delivery and bilateral salpingectomy.     SUBJECTIVE:  No acute events overnight. Pain is well controlled with PRN pain medication. No problems with ambulating, voiding, or PO intake. Has had flatus and a BM. Denies nausea and vomiting. Patient is having appropriate lochia which is decreasing. Patient denies shortness of breath, increased swelling or tenderness in lower extremities.     OBJECTIVE:  Physical exam:  Vital Signs Last 24 Hrs  T(C): 36.9 (04 Aug 2019 19:40), Max: 36.9 (04 Aug 2019 19:40)  T(F): 98.5 (04 Aug 2019 19:40), Max: 98.5 (04 Aug 2019 19:40)  HR: 84 (04 Aug 2019 19:40) (80 - 84)  BP: 134/79 (04 Aug 2019 19:40) (129/80 - 134/79)  RR: 20 (04 Aug 2019 19:40) (20 - 20)  SpO2: 98% (04 Aug 2019 19:40) (98% - 98%)  General: alert and oriented x3, no acute distress.  breast: soft, no tenderness to palpation.  Abdomen: Soft, appropriately tender to palpitation, firm uterine fundus at umbilicus, bowel sounds present in all four quadrants. Incision appears dry and intact.  Extremities: no tenderness, redness or swelling in lower extremities bilaterally.     Labs:                         11.1   19.76 )-----------( 246      ( 03 Aug 2019 08:48 )             35.5       US Duplex Venous Lower Ext Complete, Bilateral (19 @ 01:31)  IMPRESSION:   No evidence of deep venous thrombosis in either lower extremity.

## 2019-08-05 NOTE — PROGRESS NOTE ADULT - ASSESSMENT
ASSESSMENT  AMRIT Golden is a 44y year old  who is post-op day #3 who delivered a male infant at 36w for PPROM and labor by repeat  delivery and bilateral salpingectomy. No acute events.     PLAN  1. Routine post-op care  2. Continue to encourage ambulation, PO intake and breastfeeding  3. DVT prophylaxis: lovenox 40mg qdaily  4. Continue pain management PRN.   5. Remove dressing at 24hr post-op  6. Plan to discharge post-op day 3

## 2019-08-06 PROBLEM — Z78.9 OTHER SPECIFIED HEALTH STATUS: Chronic | Status: ACTIVE | Noted: 2019-08-02

## 2019-08-09 LAB — SURGICAL PATHOLOGY STUDY: SIGNIFICANT CHANGE UP

## 2019-08-16 ENCOUNTER — APPOINTMENT (OUTPATIENT)
Dept: OBGYN | Facility: CLINIC | Age: 44
End: 2019-08-16
Payer: COMMERCIAL

## 2019-08-16 VITALS
DIASTOLIC BLOOD PRESSURE: 74 MMHG | BODY MASS INDEX: 32.73 KG/M2 | WEIGHT: 221 LBS | HEIGHT: 69 IN | SYSTOLIC BLOOD PRESSURE: 110 MMHG

## 2019-08-16 PROCEDURE — 0503F POSTPARTUM CARE VISIT: CPT

## 2019-08-16 NOTE — END OF VISIT
[FreeTextEntry3] : Status 2 weeks post  and tubal ligation done at [facility] because she was 35 weeks pregnant with ruptured membranes and  labor patient had a baby boy postpartum recovery is okay clean and dry incision therefore the patient p.r.n.

## 2019-11-28 ENCOUNTER — EMERGENCY (EMERGENCY)
Facility: HOSPITAL | Age: 44
LOS: 1 days | End: 2019-11-28
Admitting: EMERGENCY MEDICINE
Payer: COMMERCIAL

## 2019-11-28 PROCEDURE — 99284 EMERGENCY DEPT VISIT MOD MDM: CPT

## 2019-11-28 PROCEDURE — 71045 X-RAY EXAM CHEST 1 VIEW: CPT | Mod: 26

## 2020-08-14 NOTE — OB RN PREOPERATIVE CHECKLIST - NS_PREOPBLOODCONS_OBGYN_ALL_OB
n/a Plan: Discussed possible accutane due to large amount of acne scarring. Discussed risks and side effects. Patient is not on any forms of birth control at this time. Detail Level: Zone Initiate Treatment: AM: \\n1. Wash face daily with Cerave hydrating Cleanser \\n2. Pat skin dry \\n3. Apply a thin layer of aczone to the entire face each morning. \\n4. Apply a good moisturizer with SPF daily such as CeraVe AM \\n\\nOrally: Take spironolactone 50mg once daily for two weeks, then increase to two tablets or 100mg daily  if no side effects\\n\\nPM: \\n1. Wash face with Cerave hydrating Cleanser \\n2. Pat skin dry \\n3. Apply a thin layer (pea sized amount) of Aklief- rub in well. Apply every other night then work up to nightly as tolerated. \\n4. These medications can cause dryness, irritation, and peeling- so be sure to layer a good moisturizer over top such as CeraVe PM.\\n\\nChest/Back: \\n1. Wash chest and back in the shower with \\n2. Pat skin dry\\n3. Apply a thin layer of Aczone to the chest and back in the morning. \\n4. Apply a thin layer of Aklief to the chest and back at night.\\n5. use a good moisturizer over top.

## 2022-01-28 ENCOUNTER — APPOINTMENT (OUTPATIENT)
Dept: RADIOLOGY | Facility: CLINIC | Age: 47
End: 2022-01-28
Payer: COMMERCIAL

## 2022-01-28 PROCEDURE — 71046 X-RAY EXAM CHEST 2 VIEWS: CPT

## 2022-03-11 ENCOUNTER — APPOINTMENT (OUTPATIENT)
Dept: RADIOLOGY | Facility: CLINIC | Age: 47
End: 2022-03-11
Payer: COMMERCIAL

## 2022-03-11 PROCEDURE — 73610 X-RAY EXAM OF ANKLE: CPT | Mod: RT

## 2022-03-11 PROCEDURE — 73630 X-RAY EXAM OF FOOT: CPT | Mod: RT

## 2022-08-17 NOTE — BRIEF OPERATIVE NOTE - SPECIMENS
Follow up with your primary care physician and gastroenterology  Use the prescribed medications as directed  Please return to the emergency department if you develop worsening symptoms, severe pain, uncontrolled vomiting, or anything else concerning to you  fallopian tubes